# Patient Record
Sex: MALE | Race: WHITE
[De-identification: names, ages, dates, MRNs, and addresses within clinical notes are randomized per-mention and may not be internally consistent; named-entity substitution may affect disease eponyms.]

---

## 2018-04-10 ENCOUNTER — HOSPITAL ENCOUNTER (OUTPATIENT)
Dept: HOSPITAL 92 - TBSIIMAG | Age: 73
Discharge: HOME | End: 2018-04-10
Attending: NEUROLOGICAL SURGERY
Payer: COMMERCIAL

## 2018-04-10 DIAGNOSIS — S32.010D: ICD-10-CM

## 2018-04-10 DIAGNOSIS — S32.040D: ICD-10-CM

## 2018-04-10 DIAGNOSIS — M48.56XA: Primary | ICD-10-CM

## 2018-04-10 PROCEDURE — 72100 X-RAY EXAM L-S SPINE 2/3 VWS: CPT

## 2018-04-10 NOTE — RAD
TWO VIEWS LUMBOSACRAL SPINE:

 

HISTORY:

Fall two weeks ago with compression fracture and back pain.

 

COMPARISON:

03/19/2018

 

FINDINGS:

Two views of the thoracic spine show wedge compression deformity of the L1 and L4 vertebral bodies.  
The L1 vertebral body has decreased in height compared to the prior examination, and there is now jesu
roximately 40% to 50% height loss.  The L4 vertebral body has also decreased in height, to approximat
ely 10% to 25% height loss.  The vertebral bodies demonstrate normal alignment without subluxation.  
Moderate degenerative changes are seen throughout the lumbar spine.

 

IMPRESSION:

Compression fractures of L1 and L4 have undergone height loss compared to the prior exam.

 

POS: [facility]